# Patient Record
Sex: MALE | Race: WHITE | NOT HISPANIC OR LATINO | Employment: FULL TIME | URBAN - METROPOLITAN AREA
[De-identification: names, ages, dates, MRNs, and addresses within clinical notes are randomized per-mention and may not be internally consistent; named-entity substitution may affect disease eponyms.]

---

## 2022-06-30 ENCOUNTER — HOSPITAL ENCOUNTER (EMERGENCY)
Facility: CLINIC | Age: 37
Discharge: HOME | End: 2022-06-30
Attending: EMERGENCY MEDICINE | Admitting: EMERGENCY MEDICINE
Payer: COMMERCIAL

## 2022-06-30 ENCOUNTER — APPOINTMENT (OUTPATIENT)
Dept: CT IMAGING | Facility: CLINIC | Age: 37
End: 2022-06-30
Attending: EMERGENCY MEDICINE
Payer: COMMERCIAL

## 2022-06-30 VITALS
HEART RATE: 78 BPM | TEMPERATURE: 98.2 F | OXYGEN SATURATION: 96 % | DIASTOLIC BLOOD PRESSURE: 97 MMHG | RESPIRATION RATE: 18 BRPM | SYSTOLIC BLOOD PRESSURE: 144 MMHG | WEIGHT: 187 LBS

## 2022-06-30 DIAGNOSIS — R51.9 NONINTRACTABLE HEADACHE, UNSPECIFIED CHRONICITY PATTERN, UNSPECIFIED HEADACHE TYPE: ICD-10-CM

## 2022-06-30 LAB
ANION GAP SERPL CALCULATED.3IONS-SCNC: 8 MMOL/L (ref 3–14)
BASOPHILS # BLD AUTO: 0 10E3/UL (ref 0–0.2)
BASOPHILS NFR BLD AUTO: 0 %
BUN SERPL-MCNC: 9 MG/DL (ref 7–30)
CALCIUM SERPL-MCNC: 9.7 MG/DL (ref 8.5–10.1)
CHLORIDE BLD-SCNC: 102 MMOL/L (ref 94–109)
CO2 SERPL-SCNC: 28 MMOL/L (ref 20–32)
CREAT SERPL-MCNC: 0.77 MG/DL (ref 0.66–1.25)
EOSINOPHIL # BLD AUTO: 0.1 10E3/UL (ref 0–0.7)
EOSINOPHIL NFR BLD AUTO: 1 %
ERYTHROCYTE [DISTWIDTH] IN BLOOD BY AUTOMATED COUNT: 10.9 % (ref 10–15)
GFR SERPL CREATININE-BSD FRML MDRD: >90 ML/MIN/1.73M2
GLUCOSE BLD-MCNC: 101 MG/DL (ref 70–99)
HCT VFR BLD AUTO: 43.1 % (ref 40–53)
HGB BLD-MCNC: 14.7 G/DL (ref 13.3–17.7)
IMM GRANULOCYTES # BLD: 0 10E3/UL
IMM GRANULOCYTES NFR BLD: 0 %
LYMPHOCYTES # BLD AUTO: 1.2 10E3/UL (ref 0.8–5.3)
LYMPHOCYTES NFR BLD AUTO: 22 %
MCH RBC QN AUTO: 31.1 PG (ref 26.5–33)
MCHC RBC AUTO-ENTMCNC: 34.1 G/DL (ref 31.5–36.5)
MCV RBC AUTO: 91 FL (ref 78–100)
MONOCYTES # BLD AUTO: 0.4 10E3/UL (ref 0–1.3)
MONOCYTES NFR BLD AUTO: 8 %
NEUTROPHILS # BLD AUTO: 3.6 10E3/UL (ref 1.6–8.3)
NEUTROPHILS NFR BLD AUTO: 69 %
NRBC # BLD AUTO: 0 10E3/UL
NRBC BLD AUTO-RTO: 0 /100
PLATELET # BLD AUTO: 190 10E3/UL (ref 150–450)
POTASSIUM BLD-SCNC: 3.8 MMOL/L (ref 3.4–5.3)
RBC # BLD AUTO: 4.72 10E6/UL (ref 4.4–5.9)
SODIUM SERPL-SCNC: 138 MMOL/L (ref 133–144)
WBC # BLD AUTO: 5.4 10E3/UL (ref 4–11)

## 2022-06-30 PROCEDURE — 96375 TX/PRO/DX INJ NEW DRUG ADDON: CPT

## 2022-06-30 PROCEDURE — 82310 ASSAY OF CALCIUM: CPT | Performed by: EMERGENCY MEDICINE

## 2022-06-30 PROCEDURE — 85014 HEMATOCRIT: CPT | Performed by: EMERGENCY MEDICINE

## 2022-06-30 PROCEDURE — 36415 COLL VENOUS BLD VENIPUNCTURE: CPT | Performed by: EMERGENCY MEDICINE

## 2022-06-30 PROCEDURE — 99285 EMERGENCY DEPT VISIT HI MDM: CPT | Mod: 25

## 2022-06-30 PROCEDURE — 250N000011 HC RX IP 250 OP 636: Performed by: EMERGENCY MEDICINE

## 2022-06-30 PROCEDURE — 96374 THER/PROPH/DIAG INJ IV PUSH: CPT

## 2022-06-30 PROCEDURE — 70450 CT HEAD/BRAIN W/O DYE: CPT

## 2022-06-30 RX ORDER — KETOROLAC TROMETHAMINE 15 MG/ML
15 INJECTION, SOLUTION INTRAMUSCULAR; INTRAVENOUS ONCE
Status: COMPLETED | OUTPATIENT
Start: 2022-06-30 | End: 2022-06-30

## 2022-06-30 RX ORDER — ONDANSETRON 2 MG/ML
4 INJECTION INTRAMUSCULAR; INTRAVENOUS ONCE
Status: COMPLETED | OUTPATIENT
Start: 2022-06-30 | End: 2022-06-30

## 2022-06-30 RX ADMIN — ONDANSETRON 4 MG: 2 INJECTION INTRAMUSCULAR; INTRAVENOUS at 20:46

## 2022-06-30 RX ADMIN — KETOROLAC TROMETHAMINE 15 MG: 15 INJECTION, SOLUTION INTRAMUSCULAR; INTRAVENOUS at 20:46

## 2022-06-30 ASSESSMENT — ENCOUNTER SYMPTOMS
NECK PAIN: 0
SHORTNESS OF BREATH: 0
HEADACHES: 1
NERVOUS/ANXIOUS: 1
NAUSEA: 1

## 2022-07-01 NOTE — ED TRIAGE NOTES
Pt reports that he has been having persistent headaches for the last month in the left frontal area of forehead. PT reports that today he thinks its the worse it has been, pt reports that he feels like he has a foggier head today. PT reports that he is also having body aches and states that he has been having nausea. VSS and ABC's intact. Pt denies blurred vision or slurred speech, no one sided weakness, some dizziness that comes and goes

## 2022-07-01 NOTE — ED PROVIDER NOTES
History   Chief Complaint:  Headache       The history is provided by the patient.      Francis Garcia is a 37 year old male who presents with headache. He states that he has been experiencing consistent, daily headaches ongoing for one month, with today worse than usual. The patient has not had a recent head injury or loss of consciousness.  He localizes pain to the left temple and left side of his head. He states that the right side of his head feels normal. The patient notes that he is unable to sit still due to his symptoms and mentions feeling anxious earlier today. He has not seen anyone for his headache. The patient has used ibuprofen, Tylenol, and allergy medication for pain management, which he notes some relief. He endorses severe nausea. The patient denies blurry vision, neck pain, congestion, syncope, recent falls, or issues with his extremities. He has no other complaints.     Review of Systems   HENT: Negative for congestion.    Eyes: Negative for visual disturbance.   Respiratory: Negative for shortness of breath.    Gastrointestinal: Positive for nausea.   Musculoskeletal: Negative for neck pain.   Neurological: Positive for headaches. Negative for syncope.   Psychiatric/Behavioral: The patient is nervous/anxious.    All other systems reviewed and are negative.    Allergies:  The patient has no known allergies.     Medications:  The patient is currently on no regular medications.    Past Medical History:     The patient denies past medical history.     Past Surgical History:    The patient denies past surgical history.     Family History:    The patient denies past family history.     Social History:  The patient presents to the ED alone.   He works as a .     Physical Exam     Patient Vitals for the past 24 hrs:   BP Temp Temp src Pulse Resp SpO2 Weight   06/30/22 2108 (!) 154/102 -- -- 90 -- 98 % --   06/30/22 1956 (!) 149/106 98.2  F (36.8  C) Temporal 92 18 99 % 84.8 kg (187 lb)      Physical Exam    Gen: alert  HEENT: PERRL, oropharynx clear  Neck: normal ROM  CV: RRR, no murmurs  Pulm: breath sounds equal, lungs clear  Abd: Soft, nontender  Back: no evidence of injury, no cva tenderness  MSK: no deformity, moves all extremities  Skin: no rash  Neuro: alert, oriented x3, PERRL, EOMI, CN 2-7 and 9-12 intact, 5/5 grasp BUE, 5/5 elbow flexion and extension BUE, 5/5 shoulder abduction BUE, 5/5 hip flexion, knee flexion, knee extension, plantar and dorsiflexion BLE, no pronator drift, normal gait, negative romberg, no dysdiadochokinesia, normal finger-nose-finger testing       Emergency Department Course     Imaging:  Head CT w/o contrast   Final Result   IMPRESSION:   1.  No intracranial hemorrhage, mass lesions, hydrocephalus or CT evidence for an acute infarct.      Report per radiology    Laboratory:  Labs Ordered and Resulted from Time of ED Arrival to Time of ED Departure   BASIC METABOLIC PANEL - Abnormal       Result Value    Sodium 138      Potassium 3.8      Chloride 102      Carbon Dioxide (CO2) 28      Anion Gap 8      Urea Nitrogen 9      Creatinine 0.77      Calcium 9.7      Glucose 101 (*)     GFR Estimate >90     CBC WITH PLATELETS AND DIFFERENTIAL    WBC Count 5.4      RBC Count 4.72      Hemoglobin 14.7      Hematocrit 43.1      MCV 91      MCH 31.1      MCHC 34.1      RDW 10.9      Platelet Count 190      % Neutrophils 69      % Lymphocytes 22      % Monocytes 8      % Eosinophils 1      % Basophils 0      % Immature Granulocytes 0      NRBCs per 100 WBC 0      Absolute Neutrophils 3.6      Absolute Lymphocytes 1.2      Absolute Monocytes 0.4      Absolute Eosinophils 0.1      Absolute Basophils 0.0      Absolute Immature Granulocytes 0.0      Absolute NRBCs 0.0          Emergency Department Course:    Reviewed:  I reviewed nursing notes and vitals     Assessments:  2025 I obtained history and examined the patient as noted above.   2204 I rechecked the patient and explained  findings. At this point I feel that the patient is safe for discharge, and the patient agrees.     Interventions:  2046 Toradol 15 mg IV  2046 Zofran 4 mg IV    Disposition:  The patient was discharged to home.     Impression & Plan     Medical Decision Making:  Patient presents for new progressive headache over the past 1 month.  Neurologic exam normal.  CT head obtained and normal without evidence of mass hemorrhage or hydrocephalus.  Result discussed with the patient.  Feeling much improved after Zofran and Toradol.  Screening laboratory studies without anemia or other metabolic derangement.  Patient does work out in the heat as a  however has been hydrating well.  No signs of heat illness or dehydration on labs or exam.  No trauma.  Discussed with patient that pain pattern is somewhat reminiscent of tension headache pattern.  Recommended close follow-up with PCP.  Consider physical therapy.  Return for any new or worsening symptoms.      Diagnosis:    ICD-10-CM    1. Nonintractable headache, unspecified chronicity pattern, unspecified headache type  R51.9        Discharge Medications: none  New Prescriptions    No medications on file       Scribe Disclosure:  I, Matthew Ram, am serving as a scribe at 8:27 PM on 6/30/2022 to document services personally performed by Vanessa Higgins MD based on my observations and the provider's statements to me.   I, Annika Hall, am serving as a scribe  at 8:35 PM on 6/30/2022.       Vanessa Higgins MD  07/01/22 0048